# Patient Record
Sex: FEMALE | Race: WHITE | NOT HISPANIC OR LATINO
[De-identification: names, ages, dates, MRNs, and addresses within clinical notes are randomized per-mention and may not be internally consistent; named-entity substitution may affect disease eponyms.]

---

## 2017-08-09 ENCOUNTER — TRANSCRIPTION ENCOUNTER (OUTPATIENT)
Age: 62
End: 2017-08-09

## 2017-08-09 DIAGNOSIS — K90.0 CELIAC DISEASE: ICD-10-CM

## 2017-08-09 DIAGNOSIS — Z87.39 PERSONAL HISTORY OF OTHER DISEASES OF THE MUSCULOSKELETAL SYSTEM AND CONNECTIVE TISSUE: ICD-10-CM

## 2017-08-09 DIAGNOSIS — J45.909 UNSPECIFIED ASTHMA, UNCOMPLICATED: ICD-10-CM

## 2017-08-09 DIAGNOSIS — T56.1X1A TOXIC EFFECT OF MERCURY AND ITS COMPOUNDS, ACCIDENTAL (UNINTENTIONAL), INITIAL ENCOUNTER: ICD-10-CM

## 2017-08-09 DIAGNOSIS — M43.10 SPONDYLOLISTHESIS, SITE UNSPECIFIED: ICD-10-CM

## 2017-08-09 DIAGNOSIS — Z87.891 PERSONAL HISTORY OF NICOTINE DEPENDENCE: ICD-10-CM

## 2017-08-09 DIAGNOSIS — M46.92 UNSPECIFIED INFLAMMATORY SPONDYLOPATHY, CERVICAL REGION: ICD-10-CM

## 2017-08-10 ENCOUNTER — EMERGENCY (EMERGENCY)
Facility: HOSPITAL | Age: 62
LOS: 1 days | Discharge: HOME | End: 2017-08-10
Attending: INTERNAL MEDICINE | Admitting: INTERNAL MEDICINE
Payer: COMMERCIAL

## 2017-08-10 VITALS
DIASTOLIC BLOOD PRESSURE: 81 MMHG | HEIGHT: 68.5 IN | TEMPERATURE: 98 F | RESPIRATION RATE: 13 BRPM | HEART RATE: 77 BPM | SYSTOLIC BLOOD PRESSURE: 128 MMHG | OXYGEN SATURATION: 96 % | WEIGHT: 175.05 LBS

## 2017-08-10 VITALS
RESPIRATION RATE: 14 BRPM | HEART RATE: 74 BPM | SYSTOLIC BLOOD PRESSURE: 122 MMHG | OXYGEN SATURATION: 97 % | DIASTOLIC BLOOD PRESSURE: 76 MMHG | TEMPERATURE: 98 F

## 2017-08-10 DIAGNOSIS — Z88.1 ALLERGY STATUS TO OTHER ANTIBIOTIC AGENTS STATUS: ICD-10-CM

## 2017-08-10 DIAGNOSIS — L23.7 ALLERGIC CONTACT DERMATITIS DUE TO PLANTS, EXCEPT FOOD: ICD-10-CM

## 2017-08-10 DIAGNOSIS — E21.3 HYPERPARATHYROIDISM, UNSPECIFIED: ICD-10-CM

## 2017-08-10 DIAGNOSIS — Z79.52 LONG TERM (CURRENT) USE OF SYSTEMIC STEROIDS: ICD-10-CM

## 2017-08-10 DIAGNOSIS — Z88.0 ALLERGY STATUS TO PENICILLIN: ICD-10-CM

## 2017-08-10 DIAGNOSIS — N83.201 UNSPECIFIED OVARIAN CYST, RIGHT SIDE: Chronic | ICD-10-CM

## 2017-08-10 DIAGNOSIS — Z91.040 LATEX ALLERGY STATUS: ICD-10-CM

## 2017-08-10 PROCEDURE — 99283 EMERGENCY DEPT VISIT LOW MDM: CPT

## 2017-08-10 NOTE — ED PROVIDER NOTE - OBJECTIVE STATEMENT
63 y/o w/f with poison oak rash of the upper extremities and abdomen, minor. She was concerned about possible spread to the face. She is taking prednisone 60mg Day- 1 and had solumedrol 60im yesterday. She thinks that is is overall improving except for a possible reaction to [place].

## 2017-08-10 NOTE — ED ADULT TRIAGE NOTE - CHIEF COMPLAINT QUOTE
poison ivy, received solumedrol injection yesterday 40 MG, started prednisone today 60 mg this morning.  wants to make sure it is not in her eye and mouth.

## 2017-08-10 NOTE — ED PROVIDER NOTE - SKIN, MLM
Skin  rash. B/L arms and abdomen, consistant with PO, no sign of cellulitis, no sign of facial spread

## 2017-09-05 PROBLEM — J45.909 UNCOMPLICATED ASTHMA, UNSPECIFIED ASTHMA SEVERITY: Status: ACTIVE | Noted: 2017-09-05

## 2017-09-05 PROBLEM — K90.0 ADULT CELIAC DISEASE: Status: ACTIVE | Noted: 2017-09-05

## 2017-09-05 PROBLEM — T56.1X1A: Status: ACTIVE | Noted: 2017-09-05

## 2017-09-05 RX ORDER — VITAMIN K2 90 MCG
125 MCG CAPSULE ORAL
Refills: 0 | Status: ACTIVE | COMMUNITY

## 2017-09-05 RX ORDER — ALBUTEROL SULFATE 90 UG/1
108 AEROSOL, METERED RESPIRATORY (INHALATION)
Refills: 0 | Status: ACTIVE | COMMUNITY

## 2017-09-05 RX ORDER — CALCIUM CARBONATE 260MG(650)
TABLET,CHEWABLE ORAL
Refills: 0 | Status: ACTIVE | COMMUNITY

## 2017-09-05 RX ORDER — FLUTICASONE PROPIONATE 44 UG/1
44 AEROSOL, METERED RESPIRATORY (INHALATION) TWICE DAILY
Refills: 0 | Status: ACTIVE | COMMUNITY

## 2017-09-05 RX ORDER — CALCIUM CARBONATE/VITAMIN D3 600 MG-10
TABLET ORAL
Refills: 0 | Status: ACTIVE | COMMUNITY

## 2017-09-26 ENCOUNTER — RECORD ABSTRACTING (OUTPATIENT)
Age: 62
End: 2017-09-26

## 2017-10-16 ENCOUNTER — APPOINTMENT (OUTPATIENT)
Dept: PULMONOLOGY | Facility: CLINIC | Age: 62
End: 2017-10-16
Payer: COMMERCIAL

## 2017-10-16 VITALS
HEIGHT: 67.5 IN | SYSTOLIC BLOOD PRESSURE: 135 MMHG | WEIGHT: 178 LBS | BODY MASS INDEX: 27.61 KG/M2 | OXYGEN SATURATION: 100 % | DIASTOLIC BLOOD PRESSURE: 80 MMHG | HEART RATE: 81 BPM

## 2017-10-16 DIAGNOSIS — J45.909 UNSPECIFIED ASTHMA, UNCOMPLICATED: ICD-10-CM

## 2017-10-16 DIAGNOSIS — R05 COUGH: ICD-10-CM

## 2017-10-16 DIAGNOSIS — M79.7 FIBROMYALGIA: ICD-10-CM

## 2017-10-16 DIAGNOSIS — L20.9 ATOPIC DERMATITIS, UNSPECIFIED: ICD-10-CM

## 2017-10-16 PROCEDURE — 99214 OFFICE O/P EST MOD 30 MIN: CPT

## 2017-10-16 RX ORDER — LACTOBACIL 2/BIFIDO 1/S.THERMO 900B CELL
PACKET (EA) ORAL DAILY
Qty: 90 | Refills: 0 | Status: ACTIVE | COMMUNITY

## 2017-10-16 RX ORDER — PREDNISONE 5 MG/1
5 TABLET ORAL DAILY
Qty: 90 | Refills: 0 | Status: ACTIVE | COMMUNITY
Start: 2017-10-16 | End: 1900-01-01

## 2017-11-13 ENCOUNTER — RX RENEWAL (OUTPATIENT)
Age: 62
End: 2017-11-13

## 2017-12-13 ENCOUNTER — RX RENEWAL (OUTPATIENT)
Age: 62
End: 2017-12-13

## 2017-12-13 RX ORDER — BECLOMETHASONE DIPROPIONATE 80 UG/1
80 AEROSOL, METERED RESPIRATORY (INHALATION) TWICE DAILY
Qty: 8.7 | Refills: 0 | Status: ACTIVE | COMMUNITY
Start: 2017-11-13 | End: 1900-01-01

## 2018-01-22 ENCOUNTER — APPOINTMENT (OUTPATIENT)
Dept: PULMONOLOGY | Facility: CLINIC | Age: 63
End: 2018-01-22

## 2019-09-19 PROBLEM — E21.3 HYPERPARATHYROIDISM, UNSPECIFIED: Chronic | Status: ACTIVE | Noted: 2017-08-10

## 2019-09-26 ENCOUNTER — APPOINTMENT (OUTPATIENT)
Dept: ORTHOPEDIC SURGERY | Facility: CLINIC | Age: 64
End: 2019-09-26
Payer: MEDICARE

## 2019-09-26 VITALS
HEART RATE: 70 BPM | BODY MASS INDEX: 27.58 KG/M2 | WEIGHT: 182 LBS | SYSTOLIC BLOOD PRESSURE: 137 MMHG | HEIGHT: 68 IN | DIASTOLIC BLOOD PRESSURE: 76 MMHG

## 2019-09-26 DIAGNOSIS — Z78.9 OTHER SPECIFIED HEALTH STATUS: ICD-10-CM

## 2019-09-26 DIAGNOSIS — M19.042 PRIMARY OSTEOARTHRITIS, LEFT HAND: ICD-10-CM

## 2019-09-26 DIAGNOSIS — M19.041 PRIMARY OSTEOARTHRITIS, RIGHT HAND: ICD-10-CM

## 2019-09-26 DIAGNOSIS — M67.441 GANGLION, RIGHT HAND: ICD-10-CM

## 2019-09-26 DIAGNOSIS — Z87.39 PERSONAL HISTORY OF OTHER DISEASES OF THE MUSCULOSKELETAL SYSTEM AND CONNECTIVE TISSUE: ICD-10-CM

## 2019-09-26 DIAGNOSIS — Z87.09 PERSONAL HISTORY OF OTHER DISEASES OF THE RESPIRATORY SYSTEM: ICD-10-CM

## 2019-09-26 DIAGNOSIS — Z80.1 FAMILY HISTORY OF MALIGNANT NEOPLASM OF TRACHEA, BRONCHUS AND LUNG: ICD-10-CM

## 2019-09-26 PROCEDURE — 99203 OFFICE O/P NEW LOW 30 MIN: CPT | Mod: 25

## 2019-09-26 PROCEDURE — 73130 X-RAY EXAM OF HAND: CPT | Mod: RT

## 2019-09-26 PROCEDURE — 20612 ASPIRATE/INJ GANGLION CYST: CPT | Mod: F8

## 2020-09-21 ENCOUNTER — TRANSCRIPTION ENCOUNTER (OUTPATIENT)
Age: 65
End: 2020-09-21

## 2020-11-09 ENCOUNTER — FORM ENCOUNTER (OUTPATIENT)
Age: 65
End: 2020-11-09

## 2020-11-16 ENCOUNTER — FORM ENCOUNTER (OUTPATIENT)
Age: 65
End: 2020-11-16

## 2021-01-03 ENCOUNTER — FORM ENCOUNTER (OUTPATIENT)
Age: 66
End: 2021-01-03

## 2021-03-11 ENCOUNTER — FORM ENCOUNTER (OUTPATIENT)
Age: 66
End: 2021-03-11

## 2021-06-10 ENCOUNTER — FORM ENCOUNTER (OUTPATIENT)
Age: 66
End: 2021-06-10

## 2021-08-02 ENCOUNTER — FORM ENCOUNTER (OUTPATIENT)
Age: 66
End: 2021-08-02

## 2021-10-12 ENCOUNTER — FORM ENCOUNTER (OUTPATIENT)
Age: 66
End: 2021-10-12

## 2022-05-02 ENCOUNTER — APPOINTMENT (OUTPATIENT)
Dept: ORTHOPEDIC SURGERY | Facility: CLINIC | Age: 67
End: 2022-05-02
Payer: MEDICARE

## 2022-05-02 VITALS — BODY MASS INDEX: 28.79 KG/M2 | WEIGHT: 190 LBS | HEIGHT: 68 IN

## 2022-05-02 VITALS — WEIGHT: 190 LBS | BODY MASS INDEX: 28.79 KG/M2 | HEIGHT: 68 IN

## 2022-05-02 PROCEDURE — 99214 OFFICE O/P EST MOD 30 MIN: CPT | Mod: 25

## 2022-05-02 PROCEDURE — 20610 DRAIN/INJ JOINT/BURSA W/O US: CPT

## 2022-05-02 PROCEDURE — 73503 X-RAY EXAM HIP UNI 4/> VIEWS: CPT

## 2022-05-02 PROCEDURE — J3490M: CUSTOM

## 2022-05-02 NOTE — HISTORY OF PRESENT ILLNESS
[7] : 7 [4] : 4 [Dull/Aching] : dull/aching [Radiating] : radiating [Tightness] : tightness [Tingling] : tingling [Part time] : Work status: part time [Constant] : constant [de-identified] : 05/02/2022 This is a  67 year female present for [facility] on Physical Therapy \par ROM, Strengthening, Modalities, and HEP was using Bike and squats\par Went to [place] and did Hula Hoop and felt pain. Had an acupuncture, Lt > rt side  NO relief with PT and topical NSAIDs. Pain worse at night and with stairs, no radiation, no paresthesias.\par \par Occupation: Retired Nurse\par \par PMHx Celiac, Hyperparathyroidism, Osteopenia [] : Post Surgical Visit: no [FreeTextEntry1] : bilateral hips [FreeTextEntry5] : Patient was working out with a  and injured both hips. Pain when going up stairs and when sleeping at nighttime. [FreeTextEntry7] : left hip down the leg [de-identified] : acupuncture

## 2022-05-02 NOTE — PROCEDURE
[Left] : of the left [Greater Trochanteric Bursa] : greater trochanteric bursa [Pain] : pain [Inflammation] : inflammation [Alcohol] : alcohol [Betadine] : betadine [Ethyl Chloride sprayed topically] : ethyl chloride sprayed topically [Sterile technique used] : sterile technique used [___ cc    0.5%] : Bupivacaine (Marcaine) ~Vcc of 0.5%  [___ cc    40mg] : Triamcinolone (Kenalog) ~Vcc of 40 mg  [Call if redness, pain or fever occur] : call if redness, pain or fever occur [Apply ice for 15min out of every hour for the next 12-24 hours as tolerated] : apply ice for 15 minutes out of every hour for the next 12-24 hours as tolerated [Previous OTC use and PT nontherapeutic] : patient has tried OTC's including aspirin, Ibuprofen, Aleve, etc or prescription NSAIDS, and/or exercises at home and/or physical therapy without satisfactory response [Risks, benefits, alternatives discussed / Verbal consent obtained] : the risks benefits, and alternatives have been discussed, and verbal consent was obtained

## 2022-05-02 NOTE — ASSESSMENT
[FreeTextEntry1] : 67F p/w L troch bursitis\par \par CSI tolerated well today\par restart PT\par return 3 mo\par \par The patient was advised of the diagnosis. The natural history of the pathology was explained in full to the patient in layman's terms. All questions were answered. The risks and benefits of surgical and non-surgical treatment alternatives were explained in full to the patient. \par

## 2022-05-02 NOTE — REASON FOR VISIT
[FreeTextEntry2] : 05/02/2022 This is a  67 year female present for Bl HIP on Physical Therapy \par ROM, Strengthening, Modalities, and HEP was using Bike and squats, pain start to get worse  \par \par

## 2022-05-04 ENCOUNTER — APPOINTMENT (OUTPATIENT)
Dept: ORTHOPEDIC SURGERY | Facility: CLINIC | Age: 67
End: 2022-05-04

## 2022-08-08 ENCOUNTER — APPOINTMENT (OUTPATIENT)
Dept: ORTHOPEDIC SURGERY | Facility: CLINIC | Age: 67
End: 2022-08-08

## 2022-08-08 VITALS — HEIGHT: 68 IN | BODY MASS INDEX: 28.79 KG/M2 | WEIGHT: 190 LBS

## 2022-08-08 DIAGNOSIS — S86.001A UNSPECIFIED INJURY OF RIGHT ACHILLES TENDON, INITIAL ENCOUNTER: ICD-10-CM

## 2022-08-08 DIAGNOSIS — M70.61 TROCHANTERIC BURSITIS, RIGHT HIP: ICD-10-CM

## 2022-08-08 PROCEDURE — 99213 OFFICE O/P EST LOW 20 MIN: CPT

## 2022-08-08 NOTE — HISTORY OF PRESENT ILLNESS
[Gradual] : gradual [4] : 4 [Dull/Aching] : dull/aching [Radiating] : radiating [Frequent] : frequent [de-identified] : 8/8/22: f/u rex hips, she has improved with PT, still having some pain though. Now also noticing right lateral heel pain, worst in the night and morning, mostly ok throughout the day otherwises. Improved with NSAIDs but she does not take them frequently.\par 05/02/2022 This is a  67 year female present for Bl HIP on Physical Therapy \par ROM, Strengthening, Modalities, and HEP was using Bike and squats\par Went to [place] and did Hula Hoop and felt pain. Had an acupuncture, Lt > rt side  NO relief with PT and topical NSAIDs. Pain worse at night and with stairs, no radiation, no paresthesias.\par \par Occupation: Retired Nurse\par \par PMHx Celiac, Hyperparathyroidism, Osteopenia [] : no [FreeTextEntry7] : down right leg [FreeTextEntry9] : acupuncture [de-identified] : Physical therapy

## 2022-08-08 NOTE — ASSESSMENT
[FreeTextEntry1] : 67F p/w BL troch bursitis, right achilles tendonitis\par \par Continue PT\par Continue topical/oral NSAIDs\par return 3 mo\par \par The patient was advised of the diagnosis. The natural history of the pathology was explained in full to the patient in layman's terms. All questions were answered. The risks and benefits of surgical and non-surgical treatment alternatives were explained in full to the patient. \par

## 2022-08-08 NOTE — PHYSICAL EXAM
[Left] : left hip [Right] : right foot and ankle [] : no swelling [NL (20)] : dorsiflexion 20 degrees [NL (40)] : plantar flexion 40 degrees [5___] : plantar flexion 5[unfilled]/5 [FreeTextEntry8] : tender over posterolateral calc/achilles tendon

## 2022-08-10 ENCOUNTER — NON-APPOINTMENT (OUTPATIENT)
Age: 67
End: 2022-08-10

## 2022-08-10 DIAGNOSIS — Z92.89 PERSONAL HISTORY OF OTHER MEDICAL TREATMENT: ICD-10-CM

## 2022-08-10 DIAGNOSIS — L90.0 LICHEN SCLEROSUS ET ATROPHICUS: ICD-10-CM

## 2022-08-10 DIAGNOSIS — S05.91XA UNSPECIFIED INJURY OF RIGHT EYE AND ORBIT, INITIAL ENCOUNTER: ICD-10-CM

## 2022-08-10 DIAGNOSIS — Z78.9 OTHER SPECIFIED HEALTH STATUS: ICD-10-CM

## 2022-08-10 RX ORDER — ESTRADIOL 0.1 MG/G
0.1 CREAM VAGINAL
Refills: 0 | Status: ACTIVE | COMMUNITY

## 2022-08-11 ENCOUNTER — APPOINTMENT (OUTPATIENT)
Dept: OBGYN | Facility: CLINIC | Age: 67
End: 2022-08-11

## 2022-08-11 ENCOUNTER — NON-APPOINTMENT (OUTPATIENT)
Age: 67
End: 2022-08-11

## 2022-08-11 VITALS
DIASTOLIC BLOOD PRESSURE: 80 MMHG | HEIGHT: 55 IN | SYSTOLIC BLOOD PRESSURE: 120 MMHG | WEIGHT: 183 LBS | BODY MASS INDEX: 42.35 KG/M2

## 2022-08-11 DIAGNOSIS — B37.3 CANDIDIASIS OF VULVA AND VAGINA: ICD-10-CM

## 2022-08-11 DIAGNOSIS — E04.1 NONTOXIC SINGLE THYROID NODULE: ICD-10-CM

## 2022-08-11 PROCEDURE — 56820 COLPOSCOPY VULVA: CPT

## 2022-08-11 PROCEDURE — 87210 SMEAR WET MOUNT SALINE/INK: CPT | Mod: QW

## 2022-08-11 PROCEDURE — G0439: CPT

## 2022-08-22 ENCOUNTER — NON-APPOINTMENT (OUTPATIENT)
Age: 67
End: 2022-08-22

## 2022-12-12 ENCOUNTER — APPOINTMENT (OUTPATIENT)
Dept: OBGYN | Facility: CLINIC | Age: 67
End: 2022-12-12

## 2022-12-12 VITALS — DIASTOLIC BLOOD PRESSURE: 80 MMHG | WEIGHT: 186 LBS | BODY MASS INDEX: 185.17 KG/M2 | SYSTOLIC BLOOD PRESSURE: 120 MMHG

## 2022-12-12 PROCEDURE — 87210 SMEAR WET MOUNT SALINE/INK: CPT | Mod: QW

## 2022-12-12 PROCEDURE — 99213 OFFICE O/P EST LOW 20 MIN: CPT | Mod: 25

## 2022-12-12 PROCEDURE — 56820 COLPOSCOPY VULVA: CPT

## 2023-09-11 ENCOUNTER — NON-APPOINTMENT (OUTPATIENT)
Age: 68
End: 2023-09-11

## 2023-09-11 DIAGNOSIS — Z12.39 ENCOUNTER FOR OTHER SCREENING FOR MALIGNANT NEOPLASM OF BREAST: ICD-10-CM

## 2023-09-11 DIAGNOSIS — N90.5 ATROPHY OF VULVA: ICD-10-CM

## 2023-09-11 RX ORDER — ESTRADIOL 0.1 MG/G
0.1 CREAM VAGINAL
Qty: 1 | Refills: 2 | Status: ACTIVE | COMMUNITY
Start: 2022-12-12 | End: 1900-01-01

## 2023-09-11 RX ORDER — CLOBETASOL PROPIONATE 0.5 MG/G
0.05 CREAM TOPICAL 3 TIMES DAILY
Qty: 1 | Refills: 2 | Status: COMPLETED | COMMUNITY
End: 2023-09-11

## 2023-10-20 ENCOUNTER — APPOINTMENT (OUTPATIENT)
Dept: OBGYN | Facility: CLINIC | Age: 68
End: 2023-10-20
Payer: MEDICARE

## 2023-10-20 VITALS — DIASTOLIC BLOOD PRESSURE: 62 MMHG | SYSTOLIC BLOOD PRESSURE: 116 MMHG

## 2023-10-20 DIAGNOSIS — N90.4 LEUKOPLAKIA OF VULVA: ICD-10-CM

## 2023-10-20 PROCEDURE — 56820 COLPOSCOPY VULVA: CPT

## 2023-10-20 PROCEDURE — 99213 OFFICE O/P EST LOW 20 MIN: CPT | Mod: 25

## 2023-10-20 RX ORDER — ESTRADIOL 0.1 MG/G
0.1 CREAM VAGINAL
Refills: 0 | Status: DISCONTINUED | COMMUNITY
End: 2023-10-20

## 2023-10-20 RX ORDER — FLUCONAZOLE 200 MG/1
200 TABLET ORAL
Qty: 3 | Refills: 0 | Status: DISCONTINUED | COMMUNITY
Start: 2022-08-11 | End: 2023-10-20

## 2023-10-20 RX ORDER — CLOBETASOL PROPIONATE 0.5 MG/G
0.05 OINTMENT TOPICAL
Qty: 1 | Refills: 3 | Status: ACTIVE | COMMUNITY
Start: 2023-09-11 | End: 1900-01-01

## 2023-11-08 ENCOUNTER — APPOINTMENT (OUTPATIENT)
Dept: ORTHOPEDIC SURGERY | Facility: CLINIC | Age: 68
End: 2023-11-08
Payer: MEDICARE

## 2023-11-08 VITALS — BODY MASS INDEX: 26.98 KG/M2 | HEIGHT: 68 IN | WEIGHT: 178 LBS

## 2023-11-08 DIAGNOSIS — M70.62 TROCHANTERIC BURSITIS, LEFT HIP: ICD-10-CM

## 2023-11-08 DIAGNOSIS — M25.552 PAIN IN LEFT HIP: ICD-10-CM

## 2023-11-08 DIAGNOSIS — M17.12 UNILATERAL PRIMARY OSTEOARTHRITIS, LEFT KNEE: ICD-10-CM

## 2023-11-08 PROCEDURE — 99214 OFFICE O/P EST MOD 30 MIN: CPT | Mod: 25

## 2023-11-08 PROCEDURE — 20610 DRAIN/INJ JOINT/BURSA W/O US: CPT | Mod: LT

## 2023-11-08 PROCEDURE — 73564 X-RAY EXAM KNEE 4 OR MORE: CPT | Mod: LT

## 2023-11-08 PROCEDURE — 72100 X-RAY EXAM L-S SPINE 2/3 VWS: CPT

## 2023-11-08 RX ORDER — TRAMADOL HYDROCHLORIDE 50 MG/1
50 TABLET, COATED ORAL
Qty: 20 | Refills: 0 | Status: ACTIVE | COMMUNITY
Start: 2023-11-08 | End: 1900-01-01

## 2023-12-20 ENCOUNTER — APPOINTMENT (OUTPATIENT)
Dept: ORTHOPEDIC SURGERY | Facility: CLINIC | Age: 68
End: 2023-12-20

## 2024-01-11 DIAGNOSIS — R92.8 OTHER ABNORMAL AND INCONCLUSIVE FINDINGS ON DIAGNOSTIC IMAGING OF BREAST: ICD-10-CM

## 2025-01-28 DIAGNOSIS — Z12.39 ENCOUNTER FOR OTHER SCREENING FOR MALIGNANT NEOPLASM OF BREAST: ICD-10-CM

## 2025-02-11 ENCOUNTER — NON-APPOINTMENT (OUTPATIENT)
Age: 70
End: 2025-02-11

## 2025-02-11 ENCOUNTER — APPOINTMENT (OUTPATIENT)
Facility: CLINIC | Age: 70
End: 2025-02-11
Payer: MEDICARE

## 2025-02-11 VITALS
BODY MASS INDEX: 27.58 KG/M2 | DIASTOLIC BLOOD PRESSURE: 71 MMHG | HEIGHT: 68 IN | SYSTOLIC BLOOD PRESSURE: 134 MMHG | WEIGHT: 182 LBS

## 2025-02-11 DIAGNOSIS — N90.5 ATROPHY OF VULVA: ICD-10-CM

## 2025-02-11 DIAGNOSIS — N90.4 LEUKOPLAKIA OF VULVA: ICD-10-CM

## 2025-02-11 PROCEDURE — 56820 COLPOSCOPY VULVA: CPT

## 2025-02-11 PROCEDURE — 87210 SMEAR WET MOUNT SALINE/INK: CPT | Mod: QW

## 2025-02-11 PROCEDURE — 99213 OFFICE O/P EST LOW 20 MIN: CPT | Mod: 25

## 2025-02-11 RX ORDER — CLOBETASOL PROPIONATE CREAM USP, 0.05% 0.5 MG/G
0.05 CREAM TOPICAL
Qty: 1 | Refills: 2 | Status: ACTIVE | COMMUNITY
Start: 2025-02-11 | End: 1900-01-01

## 2025-06-04 ENCOUNTER — APPOINTMENT (OUTPATIENT)
Dept: ORTHOPEDIC SURGERY | Facility: CLINIC | Age: 70
End: 2025-06-04
Payer: MEDICARE

## 2025-06-04 VITALS — HEIGHT: 68 IN | WEIGHT: 178 LBS | BODY MASS INDEX: 26.98 KG/M2

## 2025-06-04 DIAGNOSIS — M51.369: ICD-10-CM

## 2025-06-04 DIAGNOSIS — S39.012A STRAIN OF MUSCLE, FASCIA AND TENDON OF LOWER BACK, INITIAL ENCOUNTER: ICD-10-CM

## 2025-06-04 PROCEDURE — 99214 OFFICE O/P EST MOD 30 MIN: CPT

## 2025-06-04 PROCEDURE — 72100 X-RAY EXAM L-S SPINE 2/3 VWS: CPT

## 2025-06-04 PROCEDURE — 73503 X-RAY EXAM HIP UNI 4/> VIEWS: CPT | Mod: RT

## 2025-06-04 RX ORDER — TRAMADOL HYDROCHLORIDE 50 MG/1
50 TABLET, COATED ORAL EVERY 6 HOURS
Qty: 21 | Refills: 0 | Status: ACTIVE | COMMUNITY
Start: 2025-06-04 | End: 1900-01-01